# Patient Record
Sex: MALE | Race: WHITE | Employment: FULL TIME | URBAN - METROPOLITAN AREA
[De-identification: names, ages, dates, MRNs, and addresses within clinical notes are randomized per-mention and may not be internally consistent; named-entity substitution may affect disease eponyms.]

---

## 2018-08-20 ENCOUNTER — TELEPHONE (OUTPATIENT)
Dept: OTHER | Facility: CLINIC | Age: 38
End: 2018-08-20

## 2018-08-20 NOTE — TELEPHONE ENCOUNTER
8/20/2018    Call Regarding Onboarding [employer] [facility]    Attempt 1    Message on voicemail    Comments:       Outreach   Lydia Redding

## 2018-08-24 NOTE — TELEPHONE ENCOUNTER
8/24/2018    Call Regarding Onboarding [employer] [facility]    Attempt 2    Message on voicemail    Comments:       Outreach   Lydia Redding

## 2018-09-21 NOTE — TELEPHONE ENCOUNTER
9/21/2018    Call Regarding Onboarding [employer] [facility]    Attempt 3    Message on voicemail     Comments: 0 DEP      Outreach   CC

## 2019-06-12 ENCOUNTER — TRANSFERRED RECORDS (OUTPATIENT)
Dept: HEALTH INFORMATION MANAGEMENT | Facility: CLINIC | Age: 39
End: 2019-06-12

## 2019-06-14 ENCOUNTER — HOSPITAL ENCOUNTER (EMERGENCY)
Facility: CLINIC | Age: 39
Discharge: HOME | End: 2019-06-14
Attending: EMERGENCY MEDICINE | Admitting: EMERGENCY MEDICINE
Payer: COMMERCIAL

## 2019-06-14 VITALS
BODY MASS INDEX: 28.01 KG/M2 | SYSTOLIC BLOOD PRESSURE: 130 MMHG | HEART RATE: 85 BPM | HEIGHT: 76 IN | RESPIRATION RATE: 16 BRPM | WEIGHT: 230 LBS | OXYGEN SATURATION: 98 % | TEMPERATURE: 98.6 F | DIASTOLIC BLOOD PRESSURE: 79 MMHG

## 2019-06-14 DIAGNOSIS — K61.0 PERIANAL ABSCESS: ICD-10-CM

## 2019-06-14 LAB
BASOPHILS # BLD AUTO: 0.1 10E9/L (ref 0–0.2)
BASOPHILS NFR BLD AUTO: 0.8 %
DIFFERENTIAL METHOD BLD: ABNORMAL
EOSINOPHIL # BLD AUTO: 0.2 10E9/L (ref 0–0.7)
EOSINOPHIL NFR BLD AUTO: 1.8 %
ERYTHROCYTE [DISTWIDTH] IN BLOOD BY AUTOMATED COUNT: 12.4 % (ref 10–15)
HCT VFR BLD AUTO: 42.8 %
HGB BLD-MCNC: 14 G/DL (ref 11.7–17.7)
IMM GRANULOCYTES # BLD: 0.1 10E9/L (ref 0–0.4)
IMM GRANULOCYTES NFR BLD: 0.5 %
LYMPHOCYTES # BLD AUTO: 4.4 10E9/L (ref 0.8–5.3)
LYMPHOCYTES NFR BLD AUTO: 33.2 %
MCH RBC QN AUTO: 29.6 PG (ref 26.5–33)
MCHC RBC AUTO-ENTMCNC: 32.7 G/DL (ref 31.5–36.5)
MCV RBC AUTO: 91 FL (ref 78–100)
MONOCYTES # BLD AUTO: 0.7 10E9/L (ref 0–1.3)
MONOCYTES NFR BLD AUTO: 5.3 %
NEUTROPHILS # BLD AUTO: 7.8 10E9/L (ref 1.6–8.3)
NEUTROPHILS NFR BLD AUTO: 58.4 %
NRBC # BLD AUTO: 0 10*3/UL
NRBC BLD AUTO-RTO: 0 /100
PLATELET # BLD AUTO: 392 10E9/L (ref 150–450)
RBC # BLD AUTO: 4.73 10E12/L
WBC # BLD AUTO: 13.3 10E9/L (ref 4–11)

## 2019-06-14 PROCEDURE — 85025 COMPLETE CBC W/AUTO DIFF WBC: CPT | Performed by: INTERNAL MEDICINE

## 2019-06-14 PROCEDURE — 25000132 ZZH RX MED GY IP 250 OP 250 PS 637: Performed by: EMERGENCY MEDICINE

## 2019-06-14 PROCEDURE — 99283 EMERGENCY DEPT VISIT LOW MDM: CPT | Performed by: EMERGENCY MEDICINE

## 2019-06-14 PROCEDURE — 99283 EMERGENCY DEPT VISIT LOW MDM: CPT | Mod: Z6 | Performed by: EMERGENCY MEDICINE

## 2019-06-14 RX ORDER — METRONIDAZOLE 500 MG/1
500 TABLET ORAL 2 TIMES DAILY
Qty: 14 TABLET | Refills: 1 | Status: SHIPPED | OUTPATIENT
Start: 2019-06-14 | End: 2019-06-21

## 2019-06-14 RX ORDER — CIPROFLOXACIN 500 MG/1
500 TABLET, FILM COATED ORAL 2 TIMES DAILY
Qty: 14 TABLET | Refills: 0 | Status: SHIPPED | OUTPATIENT
Start: 2019-06-14 | End: 2021-04-22

## 2019-06-14 RX ORDER — CHOLECALCIFEROL (VITAMIN D3) 50 MCG
1 TABLET ORAL DAILY
COMMUNITY

## 2019-06-14 RX ORDER — CETIRIZINE HYDROCHLORIDE 10 MG/1
10 TABLET ORAL DAILY
COMMUNITY

## 2019-06-14 RX ORDER — CIPROFLOXACIN 500 MG/1
500 TABLET, FILM COATED ORAL ONCE
Status: COMPLETED | OUTPATIENT
Start: 2019-06-14 | End: 2019-06-14

## 2019-06-14 RX ORDER — METRONIDAZOLE 500 MG/1
500 TABLET ORAL EVERY 8 HOURS SCHEDULED
Status: DISCONTINUED | OUTPATIENT
Start: 2019-06-14 | End: 2019-06-15 | Stop reason: HOSPADM

## 2019-06-14 RX ADMIN — CIPROFLOXACIN HYDROCHLORIDE 500 MG: 500 TABLET, FILM COATED ORAL at 22:15

## 2019-06-14 RX ADMIN — METRONIDAZOLE 500 MG: 500 TABLET ORAL at 22:15

## 2019-06-14 ASSESSMENT — ENCOUNTER SYMPTOMS
ABDOMINAL PAIN: 0
RECTAL PAIN: 1
CHILLS: 0
DIARRHEA: 1
FEVER: 0

## 2019-06-14 ASSESSMENT — MIFFLIN-ST. JEOR: SCORE: 2064.77

## 2019-06-14 NOTE — ED AVS SNAPSHOT
[facility], [place], Emergency Department  [address] [place] [id number]  Phone:  [phone]                                    Gabo Borden   MRN: [id number]    Department:  [facility], Emergency Department   Date of Visit:  6/14/2019           After Visit Summary Signature Page    I have received my discharge instructions, and my questions have been answered. I have discussed any challenges I see with this plan with the nurse or doctor.    ..........................................................................................................................................  Patient/Patient Representative Signature      ..........................................................................................................................................  Patient Representative Print Name and Relationship to Patient    ..................................................               ................................................  Date                                   Time    ..........................................................................................................................................  Reviewed by Signature/Title    ...................................................              ..............................................  Date                                               Time          22EPIC Rev 08/18

## 2019-06-14 NOTE — ED TRIAGE NOTES
Went to [facility] for diarrhea 12 days duration  Was told he now has a perianal abscess which spontaneously ruptured today    Pain negligible

## 2019-06-14 NOTE — ED NOTES
Bed: IN04  Expected date: 6/14/19  Expected time:   Means of arrival:   Comments:  Gabo Borden  [birth year]  Sent from [facility],  39yo with gluteal cleft abscess  Type 1 diabetic, recent return from [place]

## 2019-06-15 NOTE — DISCHARGE INSTRUCTIONS
Take antibiotics as prescribed.     Do sitz baths 2 times per day.     Please make an appointment to follow up with [facility] (phone: [phone]) in 2-3 days even if entirely better.    Return to the ER if worsening of pain, fever, or chills, or any other problems/concerns.

## 2019-06-15 NOTE — ED PROVIDER NOTES
[place] EMERGENCY DEPARTMENT ([facility])  June 14, 2019    History     Chief Complaint   Patient presents with     Wound Check     The history is provided by the patient and medical records.     Gabo Borden is a 38 year old adult without significant PMHx who presents to the Emergency Department today for evaluation of perianal abscess. Patient states he returned from [place] on Saturday, 06/08. For the past 12 days since 06/02, patient reports multiple episodes (between 5x to 8x) of diarrhea each day. Yesterday he noted improvement with only 1 episode of diarrhea. Today, he endorses having a solid bowel movement accompanied by rectal pain. Patient denies abdominal pain, fevers or chills. Additionally, patient denies taking antibiotics or inserting anything into his rectum.  Patient is that today he went to [facility] to be evaluated by his PCP and the PCP became concerned about some drainage that he was having around his anus was admitted to the ER for evaluation.  Patient says that currently he is feeling better and his pain is significantly improved.  Denies any systemic symptoms including fevers or chills.    I have reviewed the Medications, Allergies, Past Medical and Surgical History, and Social History in the Epic system.    No past medical history on file.    No past surgical history on file.    No family history on file.    Social History     Tobacco Use     Smoking status: Not on file   Substance Use Topics     Alcohol use: Not on file       No current facility-administered medications for this encounter.      Current Outpatient Medications   Medication     cetirizine (ZYRTEC) 10 MG tablet     insulin NPH (HUMULIN N/NOVOLIN N VIAL) 100 UNIT/ML vial     insulin regular (HUMULIN R/NOVOLIN R VIAL) 100 UNIT/ML vial     vitamin D3 (CHOLECALCIFEROL) 2000 units (50 mcg) tablet      No Known Allergies      Review of Systems   Constitutional: Negative for chills and fever.   Gastrointestinal:  "Positive for diarrhea and rectal pain (Perianal pain). Negative for abdominal pain.   All other systems reviewed and are negative.      Physical Exam   BP: 139/86  Pulse: 99  Temp: 98.6  F (37  C)  Resp: 16  Height: 193 cm (6' 4\")  Weight: 104.3 kg (230 lb)  SpO2: 98 %      Physical Exam   Constitutional: Gabo Borden is oriented to person, place, and time. Gabo Borden appears well-developed and well-nourished.   HENT:   Head: Normocephalic and atraumatic.   Neck: Normal range of motion.   Cardiovascular: Normal rate, regular rhythm and normal heart sounds.   Pulmonary/Chest: Effort normal and breath sounds normal. No respiratory distress.   Abdominal: Soft. Gabo Borden exhibits no distension. There is no tenderness. There is no rebound.   Genitourinary:         Musculoskeletal: Gabo Borden exhibits no tenderness.   Neurological: Gabo Borden is alert and oriented to person, place, and time.   Skin: Skin is warm and dry.   Psychiatric: Gabo Borden has a normal mood and affect. Gabo Montenegros behavior is normal. Thought content normal.       ED Course   9:23 PM  The patient was seen and examined by Bebe Kennedy MD, in [facility]        Procedures             Critical Care time:  none     Results for orders placed or performed during the hospital encounter of 06/14/19   CBC with platelets differential   Result Value Ref Range    WBC 13.3 (H) 4.0 - 11.0 10e9/L    RBC Count 4.73 10e12/L    Hemoglobin 14.0 11.7 - 17.7 g/dL    Hematocrit 42.8 %    MCV 91 78 - 100 fl    MCH 29.6 26.5 - 33.0 pg    MCHC 32.7 31.5 - 36.5 g/dL    RDW 12.4 10.0 - 15.0 %    Platelet Count 392 150 - 450 10e9/L    Diff Method Automated Method     % Neutrophils 58.4 %    % Lymphocytes 33.2 %    % Monocytes 5.3 %    % Eosinophils 1.8 %    % Basophils 0.8 %    % Immature Granulocytes 0.5 %    Nucleated RBCs 0 /100    Absolute Neutrophil 7.8 1.6 - 8.3 10e9/L    Absolute Lymphocytes 4.4 0.8 - 5.3 10e9/L    Absolute Monocytes 0.7 0.0 - " 1.3 10e9/L    Absolute Eosinophils 0.2 0.0 - 0.7 10e9/L    Absolute Basophils 0.1 0.0 - 0.2 10e9/L    Abs Immature Granulocytes 0.1 0 - 0.4 10e9/L    Absolute Nucleated RBC 0.0      Medications   ciprofloxacin (CIPRO) tablet 500 mg (500 mg Oral Given 6/14/19 3964)            No results found for this or any previous visit (from the past 24 hour(s)).      Labs Ordered and Resulted from Time of ED Arrival Up to the Time of Departure from the ED - No data to display         Assessments & Plan (with Medical Decision Making)   Patient is a 38-year-old male who presented to the ER due to draining around his anus from the area that he previously thought was a hemorrhoid.  It appears the patient likely had a small perianal abscess that has now opened up and is draining.  There does not appear to be any surrounding fluctuance or pain that needs to be further I&D at this time.  Discussed the case with the colorectal fellow who agrees the patient following up in clinic in 2 days.  Due to him being a diabetic we will start him on a course of antibiotics.  Patient does have a mild leukocytosis.  Patient has no systemic signs and no abdominal pain.  Patient agrees to return to the ER if symptoms worsen.    I have reviewed the nursing notes.    I have reviewed the findings, diagnosis, plan and need for follow up with the patient.       Medication List      There are no discharge medications for this visit.         Final diagnoses:   Perianal abscess     Darien SHARMA, am serving as a trained medical scribe to document services personally performed by Bebe Kennedy MD, based on the provider's statements to me.      Bebe SHARMA MD, was physically present and have reviewed and verified the accuracy of this note documented by Darien Irizarry.       6/14/2019   [facility], [place], EMERGENCY DEPARTMENT     Bebe Kennedy MD  06/15/19 0138

## 2019-06-18 ENCOUNTER — TELEPHONE (OUTPATIENT)
Dept: SURGERY | Facility: CLINIC | Age: 39
End: 2019-06-18

## 2019-06-18 NOTE — TELEPHONE ENCOUNTER
LM for patient to call back to make f/u appt with Cass Sin NP after hospital discharge.    Mena Kitchen LPN

## 2019-06-19 NOTE — TELEPHONE ENCOUNTER
Patient return call, offered patient appointment for 6/20/19 @ 11am. Patient unable to make appt. He said he will f/u with his primary care provider. He also complaint of some pain behind his knee, he was given cipro to take after discharge. Spoke to Cass Sin NP, patient to discontinue taking cipro and follow up with primary care provider. Patient agrees with plan. Patient will call us to make appt if his primary feels he needs to be seen by us again.    Mena Kitchen LPN

## 2019-06-19 NOTE — TELEPHONE ENCOUNTER
Left message for patient again to call back to set up f/u appt after his ED visit. Left direct number for pt to call back.    Mena Kitchen LPN

## 2020-08-13 ENCOUNTER — HOSPITAL ENCOUNTER (EMERGENCY)
Facility: CLINIC | Age: 40
Discharge: HOME | End: 2020-08-13
Attending: PHYSICIAN ASSISTANT | Admitting: PHYSICIAN ASSISTANT
Payer: COMMERCIAL

## 2020-08-13 VITALS
BODY MASS INDEX: 30.44 KG/M2 | RESPIRATION RATE: 18 BRPM | TEMPERATURE: 98.7 F | SYSTOLIC BLOOD PRESSURE: 146 MMHG | HEIGHT: 76 IN | OXYGEN SATURATION: 97 % | WEIGHT: 250 LBS | DIASTOLIC BLOOD PRESSURE: 82 MMHG

## 2020-08-13 DIAGNOSIS — K61.0 PERIANAL ABSCESS: ICD-10-CM

## 2020-08-13 PROCEDURE — 46040 I&D ISCHIORCT&/PERIRCT ABSC: CPT

## 2020-08-13 PROCEDURE — 99283 EMERGENCY DEPT VISIT LOW MDM: CPT | Mod: 25

## 2020-08-13 RX ORDER — DOXYCYCLINE 100 MG/1
100 CAPSULE ORAL 2 TIMES DAILY
Qty: 20 CAPSULE | Refills: 0 | Status: SHIPPED | OUTPATIENT
Start: 2020-08-13 | End: 2020-08-23

## 2020-08-13 RX ORDER — BISACODYL 5 MG/1
5 TABLET, DELAYED RELEASE ORAL DAILY PRN
Qty: 10 TABLET | Refills: 0 | Status: SHIPPED | OUTPATIENT
Start: 2020-08-13 | End: 2020-08-18

## 2020-08-13 ASSESSMENT — MIFFLIN-ST. JEOR: SCORE: 2145.49

## 2020-08-13 ASSESSMENT — ENCOUNTER SYMPTOMS
RECTAL PAIN: 1
CONSTIPATION: 1

## 2020-08-13 NOTE — ED PROVIDER NOTES
"  History     Chief Complaint:  Rectal pain    HPI   Gabo Borden is a 40 year old adult male who presents with rectal pain. The patient states that his pain has progressively worsened since it began on Sunday. He states that sitting down on the toilet hurts and due to inflammation he has not been able to have a bowel movement. He has been taking Miralax and was able to have a few small bowel movements. He is concerned about an abscess and infection, noting history of perianal abscess. He went to his PCP where he was given 2.5% hydrocortisone for external hemorrhoid. He denies any abdominal pain, fever or chills.     The patient has type 1 diabetes. He reports cutting back on his insulin due to decreased appetite. He did note high blood sugar before presentation.     Allergies:  Ciprofloxacin     Medications:    Zyrtec   Cipro   Humulin    Past Medical History:    Type I Diabetes   Asthma     Past Surgical History:    The patient does not have any pertinent past surgical history.    Family History:    Thyroid disease - mother   Type I Diabetes - father    Social History:  The patient was accompanied to the ED by his .  Smoking Status: Current smoker  Smokeless Tobacco: Never Used  Alcohol Use: Positive  Marital Status:  Single     Review of Systems   Gastrointestinal: Positive for constipation and rectal pain.        External hemorrhoids and associated pain   All other systems reviewed and are negative.      Physical Exam     Patient Vitals for the past 24 hrs:   BP Temp Temp src Heart Rate Resp SpO2 Height Weight   08/13/20 1619 (!) 146/82 98.7  F (37.1  C) Oral 111 18 97 % -- --   08/13/20 1617 (!) 146/82 98.7  F (37.1  C) Oral 112 18 97 % 1.93 m (6' 4\") 113.4 kg (250 lb)       Physical Exam   General: Alert and interactive. Appears well.   Head: Atraumatic, without obvious lesion, abrasion, hematoma.   Eyes: The pupils are equal and round. No scleral icterus.   ENT: No obvious abnormalities to the ears or " nose. Mucous membranes moist.   Neck:Trachea is in the midline. No obvious swelling to the neck. Full range of motion.   CV: Tachycardia. Extremities well perfused. Capillary refill brisk in fingers.   Resp: Non-labored, no retractions or accessory muscle use.     GI: Abdomen is not distended.   MS: Moving all extremities well.   Rectal: Patient has a 2 cm area of fluctuance to the superior aspect of the anus, at 12 o'clock position. This is just inferior to a previous incision. There is no active drainage. There is erythema and slight warmth. There is normal rectal tone, and no obvious tracking of the abscess into the anus. There is no crepitance. There are no skin tags or hemorrhoids.   Neuro: Alert and oriented x 3. Non-focal examination.    Psych: Awake. Alert.  Normal affect. Appropriate interactions.    Emergency Department Course     Procedures    Incision and Drainage     LOCATIONS:  Perianal     ANESTHESIA:  Local field block using Lidocaine 1% with epinephrine, total of 3 mLs     PREPARATION:  Cleansed with Normal Saline, Betadine     PROCEDURE:  Area was incised with # 11 Blade (Sharp Point) with a Single Straight incision. Wound treatment included Deloculation and Copious Purulent Drainage.  Packing consisted of No Packing.  Appropriate dressing was applied to cover the area.    PATIENT STATUS:        Patient tolerated the procedure well. There were no complications.        Emergency Department Course:     Nursing notes and vitals reviewed.    1625 I performed an exam of the patient as documented above.     1630 I performed the incision & drainage procedure as documented above.    1654 Findings and plan explained to the Patient. Patient discharged home with instructions regarding supportive care, medications, and reasons to return. The importance of close follow-up was reviewed. The patient was prescribed medications listed below.     Impression & Plan    Medical Decision Making:  Gabo Borden is a 40  year old adult who presents with perianal pain with history of perianal abscess. On exam, patient has abscess at 12 o'clock position, consistent with perianal abscess. This does not seem to track deeply into the anus/rectum. No systemic symptoms. I&D performed and successfully expressed purulent drainage, as noted in above procedure note. No signs of serious infection, like necrotizing fascitis or rapid cellulitis. Will plan for treatment with Doxycycline, hot compresses, and follow up with colon and rectal surgery for definitive management. Suggested ibuprofen/tylenol for pain and stool softening with Dulcolax. Return precautions discussed, which include fevers, chills, spreading redness, increase in pain.     Diagnosis:    ICD-10-CM    1. Perianal abscess  K61.0      Disposition:   Patient is discharged home.     Discharge Medications:     START taking      Dose / Directions   bisacodyl 5 MG EC tablet  Commonly known as:  DULCOLAX      Dose:  5 mg  Take 1 tablet (5 mg) by mouth daily as needed for constipation  Quantity:  10 tablet  Refills:  0     doxycycline hyclate 100 MG capsule  Commonly known as:  VIBRAMYCIN      Dose:  100 mg  Take 1 capsule (100 mg) by mouth 2 times daily for 10 days  Quantity:  20 capsule  Refills:  0       Scribe Disclosure:  EDITH, Kim Fields, am serving as a scribe at 4:26 PM on 8/13/2020 to document services personally performed by Brittaney Ugalde PA-C based on my observations and the provider's statements to me.   EMERGENCY DEPARTMENT       Brittaney Ugalde PA-C  08/13/20 4894

## 2020-08-13 NOTE — ED AVS SNAPSHOT
Emergency Department  [address] [facility] [address]  Phone:  [phone]  Fax:  [phone]                                    Gabo Borden   MRN: [id number]    Department:   Emergency Department   Date of Visit:  8/13/2020           After Visit Summary Signature Page    I have received my discharge instructions, and my questions have been answered. I have discussed any challenges I see with this plan with the nurse or doctor.    ..........................................................................................................................................  Patient/Patient Representative Signature      ..........................................................................................................................................  Patient Representative Print Name and Relationship to Patient    ..................................................               ................................................  Date                                   Time    ..........................................................................................................................................  Reviewed by Signature/Title    ...................................................              ..............................................  Date                                               Time          22EPIC Rev 08/18

## 2021-02-15 ENCOUNTER — TRANSFERRED RECORDS (OUTPATIENT)
Dept: HEALTH INFORMATION MANAGEMENT | Facility: CLINIC | Age: 41
End: 2021-02-15

## 2021-02-15 ENCOUNTER — MEDICAL CORRESPONDENCE (OUTPATIENT)
Dept: HEALTH INFORMATION MANAGEMENT | Facility: CLINIC | Age: 41
End: 2021-02-15

## 2021-03-03 NOTE — TELEPHONE ENCOUNTER
RECORDS RECEIVED FROM: Internal /received    DATE RECEIVED: 3.24.21    NOTES (FOR ALL VISITS) STATUS DETAILS   OFFICE NOTES from referring provider Internal  Marian Lang at [facility]    OFFICE NOTES from other specialist received  [place] [facility] 2.16.21    ED NOTES na    OPERATIVE REPORT  (thyroid, pituitary, adrenal, parathyroid) na    MEDICATION LIST Internal     IMAGING      DEXASCAN na    MRI (BRAIN) na    XR (Chest) na    CT (HEAD/NECK/CHEST/ABDOMEN) na    NUCLEAR  na    ULTRASOUND (HEAD/NECK) na    LABS     DIABETES: HBGA1C, CREATININE, FASTING LIPIDS, MICROALBUMIN URINE, POTASSIUM, TSH, T4    THYROID: TSH, T4, CBC, THYRODLONULIN, TOTAL T3, FREE T4, CALCITONIN, CEA Internal  6.14.29

## 2021-03-07 ENCOUNTER — HEALTH MAINTENANCE LETTER (OUTPATIENT)
Age: 41
End: 2021-03-07

## 2021-03-22 NOTE — PATIENT INSTRUCTIONS
"  It was great meeting you today!    As we discussed:   - switch insulins to Tresiba 30 units s/c daily at bedtime, and Novolog 5 units per 15 grams of carbs before meals, plus correction of 2 units for every range of 50 above 150 BG.   -  and start using the DEXCOM G6 CGMS.   - Use the ronald \"Inspirispal\" to help with carb counting and also calorie tracking  - schedule visit with your NP for diabetes education  - get the following tests through your PCP: HbA1c, TSH and urine albumin  - start simvastatin 20 mg daily through your PCP.     Follow up with me in 3 months.     Katie Booker MD  Endocrinology, Diabetes and Metabolism  [facility]      --------------------------------------------------  We appreciate your assistance in coordinating your healthcare.     Please upload your insulin pump, blood sugar meter and/or continuous glucose monitor at home 1-2 days before your next diabetes-related appointment.   This will allow your provider to review your  data before your scheduled virtual visit.    To ask a question to your Endocrine care team, please send them a [employer] message, or reach them by phone at [phone]     To expedite your medication refill(s), please contact your pharmacy and have them   fax a refill request to: [phone].  *Please allow 3 business days for routine medication refills.  *Please allow 5 business days for controlled substance medication refills.    For after-hours urgent Endocrine issues, that do not require 911, please dial [phone], and ask to speak with the Endocrinologist On-Call          "

## 2021-03-22 NOTE — PROGRESS NOTES
Outcome for 03/22/21 12:21 PM :Glucose data sent in Karma Message     Gabo Borden  is being evaluated via a billable video visit.      How would you like to obtain your AVS? [employer]  For the video visit, send the invitation by: Send to e-mail at: [email or web address]  Will anyone else be joining your video visit? No      Video Start Time: 10:00    Assessment & Plan     2- Type 1 diabetes mellitus without complication (H)    He has done well over the years, on NPH And R. Now BG is more erratic. Might be related to weight gain and lifestyle changes.     Plan:   - start using DEXCOM G6.   - switch insulin to Tresiba 30 units at bedtime, and Novolog 5 units per 15 grams plus 2/50 >150  - Diabetes ed through his PCP who is a CDE.   - consider switching to Tandem pump in the near future  - carb counting skills.     - update HbA1c.     Diabetes Care:   Eyes: no retinopathy, last eye exam 1.5 years ago  Kidneys: no nephropathy, last urine alb Oct 2018. - needs update  Nerves: no neuropathy  Smoking: no  Blood Pressure: HTN, had cough to ACE inh, switched to ARB (losartan)  Lipids: no HL - advised starting statin  Macrovascular: no issues    Prescriptions:   - Continuous Blood Gluc  (DEXCOM G6 ) JESSICA  Dispense: 1 each; Refill: 0  - Continuous Blood Gluc Sensor (DEXCOM G6 SENSOR) MISC  Dispense: 3 each; Refill: 11  - Continuous Blood Gluc Transmit (DEXCOM G6 TRANSMITTER) MISC  Dispense: 1 each; Refill: 3  - insulin degludec (TRESIBA FLEXTOUCH) 100 UNIT/ML pen  Dispense: 30 mL; Refill: 3  - NOVOLOG FLEXPEN 100 UNIT/ML soln  Dispense: 30 mL; Refill: 6  - insulin pen needle (31G X 5 MM) 31G X 5 MM miscellaneous  Dispense: 300 each; Refill: 6    2- Subclinical hypothyroidism:   TSH 9.5 in 2018  Repeat TSH  Likely will need therapy if still in that range or higher.     3- Weight gain:   Discussed dietary and lifestyle interventions includes diet tracking and intermittent fasting.   Increase exercise.     Review  "of the result(s) of each unique test - he reported via video his latest tests in 2019 as indicated in HPI.      75 minutes spent on the date of the encounter doing chart review, history and exam, documentation and further activities as noted above       BMI:   Estimated body mass index is 30.43 kg/m  as calculated from the following:    Height as of 8/13/20: 1.93 m (6' 4\").    Weight as of 8/13/20: 113.4 kg (250 lb).   Weight management plan: Discussed healthy diet and exercise guidelines      Return in about 3 months (around 6/24/2021).    Katie Booker MD  [facility] ENDOCRINOLOGY CLINIC [place]    -----------------------------------------------------------------------------    Subjective   Gabo is a 40 year old who presents for the following health issues: type 1 diabetes.     HPI     The patient was diagnosed with type 1 diabetes at age 2. Did well in childhood, was more unstable control in teen years. Then has been in good control most of his adult life with diabetes. No DKA. One severe hypoglycemia. He has hypoglycemia awareness. Has not been on pumps and CGMs. Has been on taking NPH and R insulin. Was switched to Lantus and Humalog 5 years ago but his control was not any better and the cost was high, so he switched back. Things were good, until the past year his control has worsened. He started working from home. He now eats breakfast every day (in the past he didn't).   He has gained 35 lbs in the past year from less activity and more calorie intake.   Was able to recently lose 6 lbs with diet and lifestyle changes.     Last HbA1c was Oct 2019: 8.1  His baseline when he was in good control was 6.7 - 7.3.  In 2018: TSH 9.51, free T4: 0.9    Diabetes Care:   Eyes: no retinopathy, last eye exam 1.5 years ago  Kidneys: no nephropathy, last urine alb Oct 2018.   Nerves: no neuropathy  Smoking: no  Blood Pressure: HTN, had cough to ACE inh, switched to ARB (losartan)  Lipids: no HL  Macrovascular: no " issues    Current treatment strategy:   Pre-pandemic insulin: Regular 30 units in AM, 30 units at noon, 35 units at dinner. NPH 15 units in the morning and 25 units at bedtime.      Now: Regular 35 units in the morning, 30 units at noon, 25 units at dinner. NPH 15 units in the morning and 15 units at bedtime.     Blood Glucose Monitoring:   Reviewed the BG he sent. Variable in the 50 to 200s range. No clear pattern.     Diet: 2-3 meals/day    Exercise: walking      Review of Systems   Constitutional, HEENT, cardiovascular, pulmonary, gi and gu systems are negative, except as otherwise noted.      Objective           Vitals:  No vitals were obtained today due to virtual visit.    Physical Exam   GENERAL: Healthy, alert and no distress  EYES: Eyes grossly normal to inspection.  No discharge or erythema, or obvious scleral/conjunctival abnormalities.  RESP: No audible wheeze, cough, or visible cyanosis.  No visible retractions or increased work of breathing.    SKIN: Visible skin clear. No significant rash, abnormal pigmentation or lesions.  NEURO: Cranial nerves grossly intact.  Mentation and speech appropriate for age.  PSYCH: Mentation appears normal, affect normal/bright, judgement and insight intact, normal speech and appearance well-groomed.        Video-Visit Details    Type of service:  Video Visit    Video End Time:11:00    Originating Location (pt. Location): Home    Distant Location (provider location):  [facility] ENDOCRINOLOGY [facility]IN[facility] [place]     Platform used for Video Visit: SHADO

## 2021-03-24 ENCOUNTER — VIRTUAL VISIT (OUTPATIENT)
Dept: ENDOCRINOLOGY | Facility: CLINIC | Age: 41
End: 2021-03-24
Payer: COMMERCIAL

## 2021-03-24 ENCOUNTER — PRE VISIT (OUTPATIENT)
Dept: ENDOCRINOLOGY | Facility: CLINIC | Age: 41
End: 2021-03-24

## 2021-03-24 DIAGNOSIS — E03.8 SUBCLINICAL HYPOTHYROIDISM: ICD-10-CM

## 2021-03-24 DIAGNOSIS — R63.5 WEIGHT GAIN: ICD-10-CM

## 2021-03-24 DIAGNOSIS — E10.9 TYPE 1 DIABETES MELLITUS WITHOUT COMPLICATION (H): Primary | ICD-10-CM

## 2021-03-24 PROCEDURE — 99205 OFFICE O/P NEW HI 60 MIN: CPT | Mod: 95 | Performed by: INTERNAL MEDICINE

## 2021-03-24 RX ORDER — INSULIN ASPART 100 [IU]/ML
INJECTION, SOLUTION INTRAVENOUS; SUBCUTANEOUS
Qty: 30 ML | Refills: 6 | Status: SHIPPED | OUTPATIENT
Start: 2021-03-24 | End: 2021-11-23

## 2021-03-24 RX ORDER — PROCHLORPERAZINE 25 MG/1
SUPPOSITORY RECTAL
Qty: 3 EACH | Refills: 11 | Status: SHIPPED | OUTPATIENT
Start: 2021-03-24 | End: 2021-03-28

## 2021-03-24 RX ORDER — PROCHLORPERAZINE 25 MG/1
SUPPOSITORY RECTAL
Qty: 1 EACH | Refills: 3 | Status: SHIPPED | OUTPATIENT
Start: 2021-03-24 | End: 2021-03-28

## 2021-03-24 RX ORDER — PROCHLORPERAZINE 25 MG/1
SUPPOSITORY RECTAL
Qty: 1 EACH | Refills: 0 | Status: SHIPPED | OUTPATIENT
Start: 2021-03-24 | End: 2021-03-28

## 2021-03-24 NOTE — LETTER
3/24/2021       RE: Gabo Borden  [address] [facility] [address]     Dear Colleague,    Thank you for referring your patient, Gabo Borden, to the [facility] ENDOCRINOLOGY [facility] [place] at [facility]. Please see a copy of my visit note below.    Outcome for 03/22/21 12:21 PM :Glucose data sent in Solve Media Message     Gabo Borden  is being evaluated via a billable video visit.      How would you like to obtain your AVS? SnapShop  For the video visit, send the invitation by: Send to e-mail at: [email or web address]  Will anyone else be joining your video visit? No      Video Start Time: 10:00    Assessment & Plan     2- Type 1 diabetes mellitus without complication (H)    He has done well over the years, on NPH And R. Now BG is more erratic. Might be related to weight gain and lifestyle changes.     Plan:   - start using DEXCOM G6.   - switch insulin to Tresiba 30 units at bedtime, and Novolog 5 units per 15 grams plus 2/50 >150  - Diabetes ed through his PCP who is a CDE.   - consider switching to Tandem pump in the near future  - carb counting skills.     - update HbA1c.     Diabetes Care:   Eyes: no retinopathy, last eye exam 1.5 years ago  Kidneys: no nephropathy, last urine alb Oct 2018. - needs update  Nerves: no neuropathy  Smoking: no  Blood Pressure: HTN, had cough to ACE inh, switched to ARB (losartan)  Lipids: no HL - advised starting statin  Macrovascular: no issues    Prescriptions:   - Continuous Blood Gluc  (DEXCOM G6 ) JESSICA  Dispense: 1 each; Refill: 0  - Continuous Blood Gluc Sensor (DEXCOM G6 SENSOR) MISC  Dispense: 3 each; Refill: 11  - Continuous Blood Gluc Transmit (DEXCOM G6 TRANSMITTER) MISC  Dispense: 1 each; Refill: 3  - insulin degludec (TRESIBA FLEXTOUCH) 100 UNIT/ML pen  Dispense: 30 mL; Refill: 3  - NOVOLOG FLEXPEN 100 UNIT/ML soln  Dispense: 30 mL; Refill: 6  - insulin pen needle (31G X 5 MM) 31G X 5  "MM miscellaneous  Dispense: 300 each; Refill: 6    2- Subclinical hypothyroidism:   TSH 9.5 in 2018  Repeat TSH  Likely will need therapy if still in that range or higher.     3- Weight gain:   Discussed dietary and lifestyle interventions includes diet tracking and intermittent fasting.   Increase exercise.     Review of the result(s) of each unique test - he reported via video his latest tests in 2019 as indicated in HPI.      75 minutes spent on the date of the encounter doing chart review, history and exam, documentation and further activities as noted above       BMI:   Estimated body mass index is 30.43 kg/m  as calculated from the following:    Height as of 8/13/20: 1.93 m (6' 4\").    Weight as of 8/13/20: 113.4 kg (250 lb).   Weight management plan: Discussed healthy diet and exercise guidelines      Return in about 3 months (around 6/24/2021).    Katie Booker MD  [facility] ENDOCRINOLOGY CLINIC [place]    -----------------------------------------------------------------------------    Subjective   Gabo is a 40 year old who presents for the following health issues: type 1 diabetes.     HPI     The patient was diagnosed with type 1 diabetes at age 2. Did well in childhood, was more unstable control in teen years. Then has been in good control most of his adult life with diabetes. No DKA. One severe hypoglycemia. He has hypoglycemia awareness. Has not been on pumps and CGMs. Has been on taking NPH and R insulin. Was switched to Lantus and Humalog 5 years ago but his control was not any better and the cost was high, so he switched back. Things were good, until the past year his control has worsened. He started working from home. He now eats breakfast every day (in the past he didn't).   He has gained 35 lbs in the past year from less activity and more calorie intake.   Was able to recently lose 6 lbs with diet and lifestyle changes.     Last HbA1c was Oct 2019: 8.1  His baseline when he was in " good control was 6.7 - 7.3.  In 2018: TSH 9.51, free T4: 0.9    Diabetes Care:   Eyes: no retinopathy, last eye exam 1.5 years ago  Kidneys: no nephropathy, last urine alb Oct 2018.   Nerves: no neuropathy  Smoking: no  Blood Pressure: HTN, had cough to ACE inh, switched to ARB (losartan)  Lipids: no HL  Macrovascular: no issues    Current treatment strategy:   Pre-pandemic insulin: Regular 30 units in AM, 30 units at noon, 35 units at dinner. NPH 15 units in the morning and 25 units at bedtime.      Now: Regular 35 units in the morning, 30 units at noon, 25 units at dinner. NPH 15 units in the morning and 15 units at bedtime.     Blood Glucose Monitoring:   Reviewed the BG he sent. Variable in the 50 to 200s range. No clear pattern.     Diet: 2-3 meals/day    Exercise: walking      Review of Systems   Constitutional, HEENT, cardiovascular, pulmonary, gi and gu systems are negative, except as otherwise noted.      Objective           Vitals:  No vitals were obtained today due to virtual visit.    Physical Exam   GENERAL: Healthy, alert and no distress  EYES: Eyes grossly normal to inspection.  No discharge or erythema, or obvious scleral/conjunctival abnormalities.  RESP: No audible wheeze, cough, or visible cyanosis.  No visible retractions or increased work of breathing.    SKIN: Visible skin clear. No significant rash, abnormal pigmentation or lesions.  NEURO: Cranial nerves grossly intact.  Mentation and speech appropriate for age.  PSYCH: Mentation appears normal, affect normal/bright, judgement and insight intact, normal speech and appearance well-groomed.        Video-Visit Details    Type of service:  Video Visit    Video End Time:11:00    Originating Location (pt. Location): Home    Distant Location (provider location):  [facility] ENDOCRINOLOG[facility] CLIN[facility] [place]     Platform used for Video Visit: [employer]

## 2021-03-26 ENCOUNTER — IMMUNIZATION (OUTPATIENT)
Dept: PEDIATRICS | Facility: CLINIC | Age: 41
End: 2021-03-26
Payer: COMMERCIAL

## 2021-03-26 PROCEDURE — 0001A PR COVID VAC PFIZER DIL RECON 30 MCG/0.3 ML IM: CPT

## 2021-03-26 PROCEDURE — 91300 PR COVID VAC PFIZER DIL RECON 30 MCG/0.3 ML IM: CPT

## 2021-03-28 DIAGNOSIS — E10.9 TYPE 1 DIABETES MELLITUS WITHOUT COMPLICATION (H): ICD-10-CM

## 2021-03-28 RX ORDER — PROCHLORPERAZINE 25 MG/1
SUPPOSITORY RECTAL
Qty: 3 EACH | Refills: 11 | Status: SHIPPED | OUTPATIENT
Start: 2021-03-28 | End: 2021-04-26

## 2021-03-28 RX ORDER — PROCHLORPERAZINE 25 MG/1
SUPPOSITORY RECTAL
Qty: 1 EACH | Refills: 0 | Status: SHIPPED | OUTPATIENT
Start: 2021-03-28 | End: 2021-04-26

## 2021-03-28 RX ORDER — PROCHLORPERAZINE 25 MG/1
SUPPOSITORY RECTAL
Qty: 1 EACH | Refills: 3 | Status: SHIPPED | OUTPATIENT
Start: 2021-03-28 | End: 2021-04-26

## 2021-04-09 ENCOUNTER — MYC MEDICAL ADVICE (OUTPATIENT)
Dept: ENDOCRINOLOGY | Facility: CLINIC | Age: 41
End: 2021-04-09

## 2021-04-09 DIAGNOSIS — E10.9 TYPE 1 DIABETES MELLITUS WITHOUT COMPLICATION (H): Primary | ICD-10-CM

## 2021-04-16 ENCOUNTER — IMMUNIZATION (OUTPATIENT)
Dept: PEDIATRICS | Facility: CLINIC | Age: 41
End: 2021-04-16
Attending: INTERNAL MEDICINE
Payer: COMMERCIAL

## 2021-04-16 PROCEDURE — 91300 PR COVID VAC PFIZER DIL RECON 30 MCG/0.3 ML IM: CPT

## 2021-04-16 PROCEDURE — 0002A PR COVID VAC PFIZER DIL RECON 30 MCG/0.3 ML IM: CPT

## 2021-04-22 ENCOUNTER — TELEPHONE (OUTPATIENT)
Dept: EDUCATION SERVICES | Facility: CLINIC | Age: 41
End: 2021-04-22

## 2021-04-22 ENCOUNTER — VIRTUAL VISIT (OUTPATIENT)
Dept: EDUCATION SERVICES | Facility: CLINIC | Age: 41
End: 2021-04-22
Attending: INTERNAL MEDICINE
Payer: COMMERCIAL

## 2021-04-22 DIAGNOSIS — E10.9 TYPE 1 DIABETES MELLITUS WITHOUT COMPLICATION (H): ICD-10-CM

## 2021-04-22 PROCEDURE — G0108 DIAB MANAGE TRN  PER INDIV: HCPCS

## 2021-04-22 NOTE — TELEPHONE ENCOUNTER
Central Prior Authorization Team   Phone: [phone]      PA Initiation    Medication: DEXCOM -PA initiated  Insurance Company: [employer] - Phone [phone] Fax [phone]  Pharmacy Filling the Rx: [id number] - [place] - [address]  Filling Pharmacy Phone: [phone]  Filling Pharmacy Fax:    Start Date: 4/22/2021

## 2021-04-22 NOTE — PROGRESS NOTES
DIABETES SELF MANAGEMENT EDUCATION: Previous Diagnosis/Individual Diabetes Review    Gabo Borden presents today for education related to Type 1 diabetes.    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    Current Diabetes Management per Patient:  Diabetes medications: YES, Injectable Medications: Tresiba Insulin 30 units daily, Novolog 5 units per 15 grams, 2 units per 50 >150 Problems taking diabetes medications regularly? No   At risk for hypoglycemia? Yes , Symptoms: Sweating and Other and Frequency: twice a week, no overnight lows since changing insulin 3 weeks ago  BG meter: Reli On Blue meter  Patient glucose self monitoring as follows: 6 times daily.   BG results: 7-day average is 167 14-day average is 152  BG values are: in and out of goal  Patient's most recent A1C 8.1 10/2019    Nutrition:  Is carb counting his food and using myfitnesspal    Physical Activity:     no regular exercise program    Diabetes Complications:  Not discussed today.    Diabetes knowledge and skills assessment:     Patient is knowledgeable in diabetes management concepts related to: Healthy Eating, Being Active, Monitoring, Taking Medication and Problem Solving  Barriers to Learning Assessment: No Barriers identified    Based on learning assessment above, most appropriate setting for further diabetes education would be: Individual setting.    INTERVENTION:   Education provided today on:  We discussed his current insulin regimen which has helped his blood  sugar control immensely.  He is doing a good job problem-solving the dosing.  He is giving the Novolog before he eats.    He is interested in getting a sensor and he would benefit from this.  It would take his problem-solving to another level.  Will see if we can get a PA as it would benefit him to do data sharing due to frequent lows.  This can only be done with a DexCom.    Pt verbalized understanding of concepts discussed and recommendations provided today.       PLAN:  Will try to run a PA for DexCom and if not covered will order Manpreet 2.  See Patient Instructions for co-developed, patient-stated behavior change goals.  AVS printed and provided to patient today.    FOLLOW-UP:  Chart routed to referring provider.  Time Spent: 30 minutes  Encounter Type: Individual    Any diabetes medication dose changes were made via the CDE Protocol and Collaborative Practice Agreement with  Physicians.

## 2021-04-22 NOTE — TELEPHONE ENCOUNTER
"Phone call to [facility] to see if there was information regarding denial when his DexCom came back at \"not covered\" at pharmacy.  They state no.  Attempted to call [employer] to initiate PA.  Could not stay on hold.  Will ask PA team to follow up on this.      Needs data sharing provided only by Dexcom due to frequent low blood sugars. Ericka Lewis RN,CDE    "

## 2021-04-23 DIAGNOSIS — E10.9 TYPE 1 DIABETES MELLITUS WITHOUT COMPLICATION (H): ICD-10-CM

## 2021-04-23 NOTE — TELEPHONE ENCOUNTER
Followed up with the pts insurance and it is excluded from the pts plan. Needs to go through DME. Orders sent to [facility]. 4/23/21

## 2021-04-23 NOTE — TELEPHONE ENCOUNTER
PRIOR AUTHORIZATION DENIED    Medication: DEXCOM Receiver-PA denied    Denial Date: 4/23/2021    Denial Rational:       Appeal Information:

## 2021-04-25 ENCOUNTER — HEALTH MAINTENANCE LETTER (OUTPATIENT)
Age: 41
End: 2021-04-25

## 2021-04-26 RX ORDER — PROCHLORPERAZINE 25 MG/1
SUPPOSITORY RECTAL
Qty: 1 EACH | Refills: 3 | Status: SHIPPED | OUTPATIENT
Start: 2021-04-26

## 2021-04-26 RX ORDER — PROCHLORPERAZINE 25 MG/1
SUPPOSITORY RECTAL
Qty: 1 EACH | Refills: 0 | Status: SHIPPED | OUTPATIENT
Start: 2021-04-26

## 2021-04-26 RX ORDER — PROCHLORPERAZINE 25 MG/1
SUPPOSITORY RECTAL
Qty: 3 EACH | Refills: 11 | Status: SHIPPED | OUTPATIENT
Start: 2021-04-26

## 2021-08-02 NOTE — PROGRESS NOTES
Gabo is a 41 year old who is being evaluated via a billable video visit.      How would you like to obtain your AVS? MyChart  If the video visit is dropped, the invitation should be resent by: Send to e-mail at: [email or web address]  Will anyone else be joining your video visit? No        Video Start Time: 1:00 pm    Assessment & Plan     2- Type 1 diabetes mellitus without complication (H)    He has done well with the transition from NPH/R to Tresiba/Novolog. Still learning carb counting. He is new to DEXCOM and likes it. Is open to exploring pump options in the near future.     Plan:   - increase Tresiba to 32 units.   - continue same Novolog plan    - update HbA1c and other annual labs    Diabetes Care:   Eyes: no retinopathy, last eye exam 1.5 years ago  Kidneys: no nephropathy, last urine alb Oct 2018. - needs update - ordered today  Nerves: no neuropathy  Smoking: no  Blood Pressure: HTN, had cough to ACE inh, switched to ARB (losartan)  Lipids: no HL - START simvastatin 20 mg daily  Macrovascular: no issues      2- Subclinical hypothyroidism:   TSH 9.5 in 2018  Repeat TSH  Likely will need therapy if still in that range or higher.     3- Weight gain:   continue tracking diet  Increase exercise.     40 minutes spent on the date of the encounter doing chart review, history and exam, documentation and further activities as noted above       3 month follow up.     Katie Booker MD  [facility] ENDOCRINOLOGY [facility]IN[facility] [place]    -----------------------------------------------------------------------------    Subjective   Gabo is a 40 year old who presents for the following health issues: type 1 diabetes.     HPI     The patient was diagnosed with type 1 diabetes at age 2. Did well in childhood, was more unstable control in teen years. Then has been in good control most of his adult life with diabetes. No DKA. One severe hypoglycemia. He has hypoglycemia awareness. Has not been on pumps and CGMs. Has been on  taking NPH and R insulin. Was switched to Lantus and Humalog 5 years ago but his control was not any better and the cost was high, so he switched back. Things were good, until the past year his control has worsened. He started working from home. He now eats breakfast every day (in the past he didn't).     Weight Issue:   He has gained 35 lbs in the past year from less activity and more calorie intake.   Baseline: ~ 225-230 lbs  Max weight 259 lbs.   Current weight: 235 lbs.   Using [employer]pal, averaging 1700 Kcal/day     Last HbA1c was Oct 2019: 8.1  His baseline when he was in good control was 6.7 - 7.3.  In 2018: TSH 9.51, free T4: 0.9    Interval History:   He switched to Tresiba/Novolog and started using DEXCOM. Tracking diet with [employer] pal and learning carb counting.     Diabetes Care:   Eyes: no retinopathy, last eye exam 1.5 years ago  Kidneys: no nephropathy, last urine alb Oct 2018.   Nerves: no neuropathy  Smoking: no  Blood Pressure: HTN, had cough to ACE inh, switched to ARB (losartan)  Lipids: no HL  Macrovascular: no issues    Current treatment strategy:   Tresiba 31 units at bedtime, and Novolog 5 units per 15 grams plus 2/50 >150.    Blood Glucose Monitoring:   DEXCOM downloaded: last 14 days:   Average: 180 mg/dL, SD 59  Time in range: 51%  Above range: 47%  Below range: 1%  Trend is overall flat     Diet: 2-3 meals/day    Exercise: walking      Review of Systems   Constitutional, HEENT, cardiovascular, pulmonary, gi and gu systems are negative, except as otherwise noted.      Objective           Vitals:  No vitals were obtained today due to virtual visit.    Physical Exam   GENERAL: Healthy, alert and no distress  EYES: Eyes grossly normal to inspection.  No discharge or erythema, or obvious scleral/conjunctival abnormalities.  RESP: No audible wheeze, cough, or visible cyanosis.  No visible retractions or increased work of breathing.    SKIN: Visible skin clear. No significant rash, abnormal  pigmentation or lesions.  NEURO: Cranial nerves grossly intact.  Mentation and speech appropriate for age.  PSYCH: Mentation appears normal, affect normal/bright, judgement and insight intact, normal speech and appearance well-groomed.        Video-Visit Details    Type of service:  Video Visit    Video End Time: 1:35    Originating Location (pt. Location): Home    Distant Location (provider location):  [facility] ENDOCRINOLOGY CLIN[facility] [place]     Platform used for Video Visit: frestyl

## 2021-08-02 NOTE — PATIENT INSTRUCTIONS
We appreciate your assistance in coordinating your healthcare.     Please upload your insulin pump, blood sugar meter and/or continuous glucose monitor at home 1-2 days before your next diabetes-related appointment.   This will allow your provider to review your  data before your scheduled virtual visit.    To ask a question to your Endocrine care team, please send them a 1000memories message, or reach them by phone at [phone]     To expedite your medication refill(s), please contact your pharmacy and have them   fax a refill request to: [phone].  *Please allow 3 business days for routine medication refills.  *Please allow 5 business days for controlled substance medication refills.    For after-hours urgent Endocrine issues, that do not require 211, please dial [phone], and ask to speak with the Endocrinologist On-Call

## 2021-08-03 ENCOUNTER — VIRTUAL VISIT (OUTPATIENT)
Dept: ENDOCRINOLOGY | Facility: CLINIC | Age: 41
End: 2021-08-03
Payer: COMMERCIAL

## 2021-08-03 DIAGNOSIS — E10.9 TYPE 1 DIABETES MELLITUS WITHOUT COMPLICATION (H): Primary | ICD-10-CM

## 2021-08-03 DIAGNOSIS — E03.8 SUBCLINICAL HYPOTHYROIDISM: ICD-10-CM

## 2021-08-03 PROCEDURE — 95251 CONT GLUC MNTR ANALYSIS I&R: CPT | Performed by: INTERNAL MEDICINE

## 2021-08-03 PROCEDURE — 99215 OFFICE O/P EST HI 40 MIN: CPT | Mod: 95 | Performed by: INTERNAL MEDICINE

## 2021-08-03 RX ORDER — INSULIN DEGLUDEC 100 U/ML
32 INJECTION, SOLUTION SUBCUTANEOUS DAILY
Qty: 30 ML | Refills: 3 | Status: SHIPPED | OUTPATIENT
Start: 2021-08-03 | End: 2022-01-20

## 2021-08-03 RX ORDER — SIMVASTATIN 20 MG
20 TABLET ORAL AT BEDTIME
Qty: 90 TABLET | Refills: 3 | Status: SHIPPED | OUTPATIENT
Start: 2021-08-03 | End: 2022-01-20

## 2021-08-03 NOTE — LETTER
8/3/2021       RE: Gabo Borden  [address] [facility] [address]     Dear Colleague,    Thank you for referring your patient, Gabo Borden, to the [facility] ENDOCRINOLOGY [facility] [place] at [facility]. Please see a copy of my visit note below.    Gabo is a 41 year old who is being evaluated via a billable video visit.      How would you like to obtain your AVS? MyChart  If the video visit is dropped, the invitation should be resent by: Send to e-mail at: [email or web address]  Will anyone else be joining your video visit? No        Video Start Time: 1:00 pm    Assessment & Plan     2- Type 1 diabetes mellitus without complication (H)    He has done well with the transition from NPH/R to Tresiba/Novolog. Still learning carb counting. He is new to DEXCOM and likes it. Is open to exploring pump options in the near future.     Plan:   - increase Tresiba to 32 units.   - continue same Novolog plan    - update HbA1c and other annual labs    Diabetes Care:   Eyes: no retinopathy, last eye exam 1.5 years ago  Kidneys: no nephropathy, last urine alb Oct 2018. - needs update - ordered today  Nerves: no neuropathy  Smoking: no  Blood Pressure: HTN, had cough to ACE inh, switched to ARB (losartan)  Lipids: no HL - START simvastatin 20 mg daily  Macrovascular: no issues      2- Subclinical hypothyroidism:   TSH 9.5 in 2018  Repeat TSH  Likely will need therapy if still in that range or higher.     3- Weight gain:   continue tracking diet  Increase exercise.     40 minutes spent on the date of the encounter doing chart review, history and exam, documentation and further activities as noted above       3 month follow up.     Katie Booker MD  [facility] [place]    -----------------------------------------------------------------------------    Subjective   Gabo is a 40 year old who presents for the following health issues: type  1 diabetes.     HPI     The patient was diagnosed with type 1 diabetes at age 2. Did well in childhood, was more unstable control in teen years. Then has been in good control most of his adult life with diabetes. No DKA. One severe hypoglycemia. He has hypoglycemia awareness. Has not been on pumps and CGMs. Has been on taking NPH and R insulin. Was switched to Lantus and Humalog 5 years ago but his control was not any better and the cost was high, so he switched back. Things were good, until the past year his control has worsened. He started working from home. He now eats breakfast every day (in the past he didn't).     Weight Issue:   He has gained 35 lbs in the past year from less activity and more calorie intake.   Baseline: ~ 225-230 lbs  Max weight 259 lbs.   Current weight: 235 lbs.   Using [employer], averaging 1700 Kcal/day     Last HbA1c was Oct 2019: 8.1  His baseline when he was in good control was 6.7 - 7.3.  In 2018: TSH 9.51, free T4: 0.9    Interval History:   He switched to Tresiba/Novolog and started using DEXCOM. Tracking diet with [employer] pal and learning carb counting.     Diabetes Care:   Eyes: no retinopathy, last eye exam 1.5 years ago  Kidneys: no nephropathy, last urine alb Oct 2018.   Nerves: no neuropathy  Smoking: no  Blood Pressure: HTN, had cough to ACE inh, switched to ARB (losartan)  Lipids: no HL  Macrovascular: no issues    Current treatment strategy:   Tresiba 31 units at bedtime, and Novolog 5 units per 15 grams plus 2/50 >150.    Blood Glucose Monitoring:   DEXCOM downloaded: last 14 days:   Average: 180 mg/dL, SD 59  Time in range: 51%  Above range: 47%  Below range: 1%  Trend is overall flat     Diet: 2-3 meals/day    Exercise: walking      Review of Systems   Constitutional, HEENT, cardiovascular, pulmonary, gi and gu systems are negative, except as otherwise noted.      Objective           Vitals:  No vitals were obtained today due to virtual visit.    Physical Exam    GENERAL: Healthy, alert and no distress  EYES: Eyes grossly normal to inspection.  No discharge or erythema, or obvious scleral/conjunctival abnormalities.  RESP: No audible wheeze, cough, or visible cyanosis.  No visible retractions or increased work of breathing.    SKIN: Visible skin clear. No significant rash, abnormal pigmentation or lesions.  NEURO: Cranial nerves grossly intact.  Mentation and speech appropriate for age.  PSYCH: Mentation appears normal, affect normal/bright, judgement and insight intact, normal speech and appearance well-groomed.        Video-Visit Details    Type of service:  Video Visit    Video End Time: 1:35    Originating Location (pt. Location): Home    Distant Location (provider location):  [facility] [place]     Platform used for Video Visit: Waterfall

## 2021-08-10 ENCOUNTER — LAB (OUTPATIENT)
Dept: LAB | Facility: CLINIC | Age: 41
End: 2021-08-10
Payer: COMMERCIAL

## 2021-08-10 DIAGNOSIS — E03.8 SUBCLINICAL HYPOTHYROIDISM: ICD-10-CM

## 2021-08-10 DIAGNOSIS — E10.9 TYPE 1 DIABETES MELLITUS WITHOUT COMPLICATION (H): ICD-10-CM

## 2021-08-10 LAB
ALBUMIN SERPL-MCNC: 3.9 G/DL (ref 3.4–5)
ALP SERPL-CCNC: 61 U/L (ref 40–150)
ALT SERPL W P-5'-P-CCNC: 22 U/L (ref 0–70)
ANION GAP SERPL CALCULATED.3IONS-SCNC: 8 MMOL/L (ref 3–14)
AST SERPL W P-5'-P-CCNC: 11 U/L (ref 0–45)
BASOPHILS # BLD AUTO: 0.1 10E3/UL (ref 0–0.2)
BASOPHILS NFR BLD AUTO: 1 %
BILIRUB SERPL-MCNC: 0.4 MG/DL (ref 0.2–1.3)
BUN SERPL-MCNC: 7 MG/DL (ref 7–30)
CALCIUM SERPL-MCNC: 9 MG/DL (ref 8.5–10.1)
CHLORIDE BLD-SCNC: 105 MMOL/L (ref 94–109)
CHOLEST SERPL-MCNC: 180 MG/DL
CO2 SERPL-SCNC: 25 MMOL/L (ref 20–32)
CREAT SERPL-MCNC: 0.63 MG/DL (ref 0.66–1.25)
CREAT UR-MCNC: 28 MG/DL
EOSINOPHIL # BLD AUTO: 0.3 10E3/UL (ref 0–0.7)
EOSINOPHIL NFR BLD AUTO: 3 %
ERYTHROCYTE [DISTWIDTH] IN BLOOD BY AUTOMATED COUNT: 13.1 % (ref 10–15)
FASTING STATUS PATIENT QL REPORTED: NO
GFR SERPL CREATININE-BSD FRML MDRD: >90 ML/MIN/1.73M2
GLUCOSE BLD-MCNC: 204 MG/DL (ref 70–99)
HBA1C MFR BLD: 8.1 % (ref 0–5.6)
HCT VFR BLD AUTO: 41.1 % (ref 40–53)
HDLC SERPL-MCNC: 46 MG/DL
HGB BLD-MCNC: 14 G/DL (ref 13.3–17.7)
IMM GRANULOCYTES # BLD: 0 10E3/UL
IMM GRANULOCYTES NFR BLD: 0 %
LDLC SERPL CALC-MCNC: 119 MG/DL
LYMPHOCYTES # BLD AUTO: 3.6 10E3/UL (ref 0.8–5.3)
LYMPHOCYTES NFR BLD AUTO: 37 %
MCH RBC QN AUTO: 30.3 PG (ref 26.5–33)
MCHC RBC AUTO-ENTMCNC: 34.1 G/DL (ref 31.5–36.5)
MCV RBC AUTO: 89 FL (ref 78–100)
MICROALBUMIN UR-MCNC: 8 MG/L
MICROALBUMIN/CREAT UR: 28.57 MG/G CR (ref 0–17)
MONOCYTES # BLD AUTO: 0.5 10E3/UL (ref 0–1.3)
MONOCYTES NFR BLD AUTO: 5 %
NEUTROPHILS # BLD AUTO: 5.4 10E3/UL (ref 1.6–8.3)
NEUTROPHILS NFR BLD AUTO: 54 %
NONHDLC SERPL-MCNC: 134 MG/DL
NRBC # BLD AUTO: 0 10E3/UL
NRBC BLD AUTO-RTO: 0 /100
PLATELET # BLD AUTO: 316 10E3/UL (ref 150–450)
POTASSIUM BLD-SCNC: 3.8 MMOL/L (ref 3.4–5.3)
PROT SERPL-MCNC: 7.3 G/DL (ref 6.8–8.8)
RBC # BLD AUTO: 4.62 10E6/UL (ref 4.4–5.9)
SODIUM SERPL-SCNC: 138 MMOL/L (ref 133–144)
T4 FREE SERPL-MCNC: 0.86 NG/DL (ref 0.76–1.46)
TRIGL SERPL-MCNC: 75 MG/DL
TSH SERPL DL<=0.005 MIU/L-ACNC: 6.52 MU/L (ref 0.4–4)
WBC # BLD AUTO: 9.9 10E3/UL (ref 4–11)

## 2021-08-10 PROCEDURE — 83036 HEMOGLOBIN GLYCOSYLATED A1C: CPT | Performed by: PATHOLOGY

## 2021-08-10 PROCEDURE — 84439 ASSAY OF FREE THYROXINE: CPT | Performed by: PATHOLOGY

## 2021-08-10 PROCEDURE — 82043 UR ALBUMIN QUANTITATIVE: CPT | Performed by: PATHOLOGY

## 2021-08-10 PROCEDURE — 80050 GENERAL HEALTH PANEL: CPT | Performed by: PATHOLOGY

## 2021-08-10 PROCEDURE — 80061 LIPID PANEL: CPT | Performed by: PATHOLOGY

## 2021-08-10 PROCEDURE — 36415 COLL VENOUS BLD VENIPUNCTURE: CPT | Performed by: PATHOLOGY

## 2021-10-02 ENCOUNTER — MYC MEDICAL ADVICE (OUTPATIENT)
Dept: ENDOCRINOLOGY | Facility: CLINIC | Age: 41
End: 2021-10-02

## 2021-10-02 ENCOUNTER — TELEPHONE (OUTPATIENT)
Dept: ENDOCRINOLOGY | Facility: CLINIC | Age: 41
End: 2021-10-02

## 2021-10-02 NOTE — TELEPHONE ENCOUNTER
Attempted to reach patient to schedule follow up in the Endocrinology Clinic.  No answer,  LM on VM to call office and [employer] message sent.    Patient to schedule with diabetes PAKO in 3 months AND with Migdalia Mcgraw in 6 months. Last visit with Dr Butcher was on 8/3/21.

## 2021-10-10 ENCOUNTER — HEALTH MAINTENANCE LETTER (OUTPATIENT)
Age: 41
End: 2021-10-10

## 2021-11-23 ENCOUNTER — TELEPHONE (OUTPATIENT)
Dept: ENDOCRINOLOGY | Facility: CLINIC | Age: 41
End: 2021-11-23
Payer: COMMERCIAL

## 2021-11-23 DIAGNOSIS — E10.9 TYPE 1 DIABETES MELLITUS WITHOUT COMPLICATION (H): ICD-10-CM

## 2021-11-23 RX ORDER — INSULIN ASPART 100 [IU]/ML
INJECTION, SOLUTION INTRAVENOUS; SUBCUTANEOUS
Qty: 100 ML | Refills: 1 | Status: SHIPPED | OUTPATIENT
Start: 2021-11-23 | End: 2022-01-20

## 2021-11-23 NOTE — TELEPHONE ENCOUNTER
ANTONIOM and sent MyChart for pt to call to schedule with a new provider (Jhony no longer with our clinic) per med refill request

## 2021-11-23 NOTE — TELEPHONE ENCOUNTER
Pt needs appt with new clinic provider, previously with Dr Booker.   LCV 08/03/2021   CCs notified. 90 day supply with 1 refill pended to on-call to cover until seen.

## 2021-11-23 NOTE — TELEPHONE ENCOUNTER
NOVOLOG FLEXPEN 100 UNIT/ML soln      Last Written Prescription Date:  03/24/21  Last Fill Quantity: 30mL,   # refills: 6  Last Office Visit : 08/03/21  Future Office visit:  None scheduled    Routing refill request to provider for review/approval because:  Insulin - refilled per clinic

## 2021-12-04 ENCOUNTER — HEALTH MAINTENANCE LETTER (OUTPATIENT)
Age: 41
End: 2021-12-04

## 2022-01-18 ENCOUNTER — TELEPHONE (OUTPATIENT)
Dept: ENDOCRINOLOGY | Facility: CLINIC | Age: 42
End: 2022-01-18
Payer: COMMERCIAL

## 2022-01-18 NOTE — TELEPHONE ENCOUNTER
Attempted to reach patient to have him upload to Dexcom, or to provide us with the last two weeks of his glucose readings.

## 2022-01-18 NOTE — PROGRESS NOTES
Outcome for 01/18/22 9:16 AM: Left mail    Faye Jeong CMA    Outcome for 01/18/22 1:29 PM: Left Rioil    Faye Jeong CMA

## 2022-01-19 ENCOUNTER — VIRTUAL VISIT (OUTPATIENT)
Dept: ENDOCRINOLOGY | Facility: CLINIC | Age: 42
End: 2022-01-19
Payer: COMMERCIAL

## 2022-01-19 DIAGNOSIS — I10 HYPERTENSION, UNSPECIFIED TYPE: ICD-10-CM

## 2022-01-19 DIAGNOSIS — E66.811 CLASS 1 OBESITY WITH SERIOUS COMORBIDITY AND BODY MASS INDEX (BMI) OF 30.0 TO 30.9 IN ADULT, UNSPECIFIED OBESITY TYPE: ICD-10-CM

## 2022-01-19 DIAGNOSIS — E10.9 TYPE 1 DIABETES MELLITUS WITHOUT COMPLICATION (H): ICD-10-CM

## 2022-01-19 DIAGNOSIS — R63.5 WEIGHT GAIN: ICD-10-CM

## 2022-01-19 DIAGNOSIS — E78.5 HYPERLIPIDEMIA LDL GOAL <100: ICD-10-CM

## 2022-01-19 DIAGNOSIS — E03.8 SUBCLINICAL HYPOTHYROIDISM: Primary | ICD-10-CM

## 2022-01-19 PROBLEM — E11.9 DIABETES MELLITUS, TYPE 2 (H): Status: ACTIVE | Noted: 2022-01-19

## 2022-01-19 PROCEDURE — 99215 OFFICE O/P EST HI 40 MIN: CPT | Mod: 95 | Performed by: INTERNAL MEDICINE

## 2022-01-19 ASSESSMENT — ENCOUNTER SYMPTOMS
BLOOD IN STOOL: 0
RECTAL PAIN: 0
CONSTIPATION: 0
BLOATING: 0
BOWEL INCONTINENCE: 0
HEARTBURN: 1
ABDOMINAL PAIN: 0
NAUSEA: 0
VOMITING: 0
JAUNDICE: 0
DIARRHEA: 0

## 2022-01-19 NOTE — PROGRESS NOTES
Gabo Borden  is being evaluated via a billable video visit.      How would you like to obtain your AVS? MyCharBaremetrics  For the video visit, send the invitation by: Send to e-mail at: [email or web address]  Will anyone else be joining your video visit? No

## 2022-01-19 NOTE — NURSING NOTE
Patient denies any changes since echeck-in regarding medication and allergies and states all information entered during echeck-in remains accurate.  Quin Olvera on 1/19/2022 at 2:12 PM

## 2022-01-19 NOTE — PATIENT INSTRUCTIONS
Nice to talk with you in virtual clinic    For now, please continue taking your insulin as you are:  NOVOLOG 5 U PER 15 G CHO (1 PER 3)  TRESIBA 32 UNITS DAILY  NOVOLOG 2U PER 50 MG/DL>150 (1 PER 25)     PLEASE INCREASE SIMVASTATIN FROM 20 TO 40 MG DAILY FOR LDL GOAL OF <100 TO REDUCE YOUR VASCULAR RISK    PLEASE INCREASE YOUR LOSARTAN DOSE FROM 25 TO 50 MG DAILY FOR YOUR HTN. Blood pressure goals, in general, for patients with hypertension <130/80. The dose increase will help reduce the microalbuminuria.    PLEASE SEE EYE MD ONCE A YEAR, REFERRAL IS IN    For BMI 30, weight loss is recommended to improve your clinica risk profile since you have DM1, HTN, & Hyperlipidemia - Please follow a 1700 calorie meal plan to lose 1 lbs weekly without exercise (based on REE calc 2200)  Start walking program working up to 4 miles daily    Use low sodium meal replacements such as Lean Cuisines and/or Healthy Choice meals and this will make it easier to count # of carbohydrates in grams and dose Novolog insulin accordingly. For example: if your meal has 30 grams of carbohydrates, you would take 10 units of Novolog to cover that + any correction if your glucose is >140 starting out the meal.     Please go to any [employer] lab. Follow standard safety precautions for COVID-19, practice social isolation, hand hygiene, do not touch your face, nose, or mouth, wear mask if have to go out in public to be respectful of community.  Please contact us to schedule at any of our [employer] lab locations. Call [phone] ([phone]), select option 1.    Please continue to check your blood sugars when you are fasting in the morning, and before a meal and 2 hours after a meal and at bedtime and log    Fasting goals are .  2 hours after eating goals are: <180.    If your fasting sugars are lower or higher than the range, you will need to make an adjustment on your overnight basal insulin rate(s).    If your sugars  after eating are lower or higher than the range, then you will need to make an adjustment on your meal time insulin (ICR/Insulin to carbohydrate ratio).  Decrease or increase basal insulin rates overnight by 0.1 units 2x weekly until fasting blood sugars are in  range.    For questions about blood sugars, please call [phone] during the day or [phone] anytime & ask the  to page diabetes/endocrine on-call.    Diabetes Education (Ericka Lewis, RN, CDE) in 2-4 weeks, Diabetes Team PA in 6 months, me in 12 months    Please use [employer] to schedule your appointment(s) or call [phone] to schedule in Endocrinology and Diabetes Clinic    Best Wishes,  Dr. Raya Solano MD, MPH  Endocrinologist

## 2022-01-19 NOTE — PROGRESS NOTES
Diabetes & Endocrinology Consult Follow up Note    Reason for visit/consult: DM type 1    Primary care provider: [facility] MD Lydia    HPI:  40 yo DM 1 X 40 YRS. DX MADE IN [facility]. He did not have a chance to upload his glucose data yet. Is on a statin 20 mg since lv w/ Malaeb    DM MEDS:  NOVOLOG 5 U PER 15 G CHO (1 PER 3)  TRESIBA 32 UNITS DAILY - TRIED 33 BUT WAS HAVING LOWS (UNDERSTANDS HOW TO MANAGE BASAL INSULIN)  NOVOLOG 2U PER 50 MG/DL>150 (1 PER 25)     IS USING DEXCOM G6    SUBJ DATA GLUCS: AVG X 90 DAYS . FASTING SUGARS ARE MUCH BETTER ON TRESIBA. GAINED WT DURING PANDEMIC.     OBJ DATA GLUCS: recent = NONE. Gabo's login for DEXCOM HOME USERS [email or web address] is:  UN: [email or web address]  PW: [email or web address]! (corby is capital O, t, a, small o, small L, 2,1!)  (the above was tried & revealed he had not yet uploaded his data, there was old data there, see below)    This is the 1st time I am seeing this patient. Saw Malaeb on  8/3/21   Plan then was   - increase Tresiba to 32 units.   - continue same Novolog plan . . . (see rest of her note for details).                 - update HbA1c and other annual labs   Lab Results   Component Value Date    A1C 8.1 08/10/2021     ENDO DIABETES Latest Ref Rng & Units 8/10/2021   GLUCOSE 70 - 99 mg/dL 204 (H)   A1C 0.0 - 5.6 % 8.1 (H)   ALT 0 - 70 U/L 22   AST 0 - 45 U/L 11   CHOL <200 mg/dL 180   LDL <=100 mg/dL 119 (H)   HDL >=40 mg/dL 46   NHDL <130 mg/dL 134 (H)   TRIGLYCERIDES <150 mg/dL 75   CREATININE 0.66 - 1.25 mg/dL 0.63 (L)   HCT 40.0 - 53.0 % 41.1   HGB 13.3 - 17.7 g/dL 14.0   MICROL mg/L 8   UMALCR 0.00 - 17.00 mg/g Cr 28.57 (H)       Diabetes Care:   Eyes: no retinopathy, last eye exam >1.5 years ago  Kidneys: no nephropathy, last urine alb Oct 2018. - needs update - ordered today  Nerves: no neuropathy  Smoking: no  Blood Pressure: HTN, had cough to ACE inh, switched to ARB (losartan)  Lipids: no HL - STARTED simvastatin 20 mg daily  Macrovascular:  "no issues     2- Subclinical hypothyroidism:   TSH 9.5 in 2018  Repeat TSH, rx when/if TSH = 10 or sxs develop  ENDO THYROID LABS-[facility] Latest Ref Rng & Units 8/10/2021   TSH 0.40 - 4.00 mU/L 6.52 (H)   FREE T4 0.76 - 1.46 ng/dL 0.86     Likely will need therapy if still in that range or higher.      3- Weight gain (RECENT OBJ DATA LACKING):   continue tracking diet  Increase exercise.   WEIGHT MGMT RESULTS/MEDICATIONS Latest Ref Rng & Units 8/13/2020   TRESIBA FLEXTOUCH 100 UNIT/ML SC SOPN     NOVOLOG FLEXPEN 100 UNIT/ML SC SOPN     INSULIN REGULAR HUMAN 100 UNIT/ML IJ SOLN     INSULIN ISOPHANE HUMAN VIAL 100 UNIT/ML SC SUSP     INSULIN DEGLUDEC 100 UNIT/ML SC SOPN     Weight  250 lbs   Height  6' 4\"   BP  146/82   Pulse     BMI (Calculated)  30.43     Dexcom Clarity   no obj glucose data avail ATOV    Past Medical/Surgical History:  No past medical history on file.  No past surgical history on file.    Allergies:  Allergies   Allergen Reactions     Ciprofloxacin Muscle Pain (Myalgia)     Tendinitis        Current Medications MEDICATIONS IN THIS CHART MAY NOT BE ACCURATE.    Current Outpatient Medications   Medication     cetirizine (ZYRTEC) 10 MG tablet     Continuous Blood Gluc  (DEXCOM G6 ) JESSICA     Continuous Blood Gluc Sensor (DEXCOM G6 SENSOR) MISC     Continuous Blood Gluc Transmit (DEXCOM G6 TRANSMITTER) MISC     insulin degludec (TRESIBA FLEXTOUCH) 100 UNIT/ML pen     insulin pen needle (31G X 5 MM) 31G X 5 MM miscellaneous     NOVOLOG FLEXPEN 100 UNIT/ML soln     simvastatin (ZOCOR) 20 MG tablet     vitamin D3 (CHOLECALCIFEROL) 2000 units (50 mcg) tablet     No current facility-administered medications for this visit.       Family History:  No family history on file.    Social History: OCC/JOB TITLE: [employer] OF [employer]AL[employer], HE SAYS HE DOES NOT HAVE A DEGREE.  Social History     Tobacco Use     Smoking status: Current Every Day Smoker     Smokeless " tobacco: Never Used   Substance Use Topics     Alcohol use: Not on file       ROS:  see HPI    Exam  No vitals, video visit  GEN: Healthy, alert and no distress  HEENT: EOMI  RESP: No audible wheeze, cough, or visible cyanosis  SKIN: Visible skin clear  NEURO: Cranial nerves grossly intact. Mentation and speech appropriate for age  PSYCH: Mentation appears normal, affect normal/bright, judgement and insight intact, normal speech and appearance well-groomed    Labs/Imaging  See above  Lab Results   Component Value Date    A1C 8.1 08/10/2021     Creatinine   Date Value Ref Range Status   08/10/2021 0.63 (L) 0.66 - 1.25 mg/dL Final     LDL Cholesterol Calculated   Date Value Ref Range Status   08/10/2021 119 (H) <=100 mg/dL Final     Comment:     Age 0-19 years:  Desirable: 0-110 mg/dL   Borderline high: 110-129 mg/dL   High: >= 130 mg/dL    Age 20 years and older:  Desirable: <100mg/dL  Above desirable: 100-129 mg/dL   Borderline high: 130-159 mg/dL   High: 160-189 mg/dL   Very high: >= 190 mg/dL     ENDO DIABETES Latest Ref Rng & Units 8/10/2021   GLUCOSE 70 - 99 mg/dL 204 (H)   A1C 0.0 - 5.6 % 8.1 (H)   ALT 0 - 70 U/L 22   AST 0 - 45 U/L 11   CHOL <200 mg/dL 180   LDL <=100 mg/dL 119 (H)   HDL >=40 mg/dL 46   NHDL <130 mg/dL 134 (H)   TRIGLYCERIDES <150 mg/dL 75   CREATININE 0.66 - 1.25 mg/dL 0.63 (L)   HCT 40.0 - 53.0 % 41.1   HGB 13.3 - 17.7 g/dL 14.0   MICROL mg/L 8   UMALCR 0.00 - 17.00 mg/g Cr 28.57 (H)   POTASSIUM 3.4 - 5.3 mmol/L 3.8   RBC 4.40 - 5.90 10e6/uL 4.62   RDW 10.0 - 15.0 % 13.1   WBC 4.0 - 11.0 10e3/uL 9.9   MCH 26.5 - 33.0 pg 30.3   MCHC 31.5 - 36.5 g/dL 34.1   MCV 78 - 100 fL 89    - 450 10e3/uL 316     Assessment and Plan    T1DM x 40 yrs associated w/ microalbuminuria, HTN & Hyperlidemia, his LDL is above goal of LDL<100, incr statin & ARB doses for goal BP <130/80.    Subclinical Hypothyroidism, consider starting LT4 after next set of thyroid labs if TSH = 10 or sxs develop or if FT4  drops below normal range    Labs: See orders    Refilled insulins    Patient Instructions:  Nice to talk with you in virtual clinic    For now, please continue taking your insulin as you are:  NOVOLOG 5 U PER 15 G CHO (1 PER 3)  TRESIBA 32 UNITS DAILY  NOVOLOG 2U PER 50 MG/DL>150 (1 PER 25)     PLEASE INCREASE SIMVASTATIN FROM 20 TO 40 MG DAILY FOR LDL GOAL OF <100 TO REDUCE YOUR VASCULAR RISK    PLEASE INCREASE YOUR LOSARTAN DOSE FROM 25 TO 50 MG DAILY FOR YOUR HTN. Blood pressure goals, in general, for patients with hypertension <130/80. The dose increase will help reduce the microalbuminuria.    PLEASE SEE EYE MD ONCE A YEAR, REFERRAL IS IN    For BMI 30, weight loss is recommended to improve your clinica risk profile since you have DM1, HTN, & Hyperlipidemia - Please follow a 1700 calorie meal plan to lose 1 lbs weekly without exercise (based on REE calc 2200)  Start walking program working up to 4 miles daily    Use low sodium meal replacements such as Lean Cuisines and/or Healthy Choice meals and this will make it easier to count # of carbohydrates in grams and dose Novolog insulin accordingly. For example: if your meal has 30 grams of carbohydrates, you would take 10 units of Novolog to cover that + any correction if your glucose is >140 starting out the meal.     Please go to any [employer] lab. Follow standard safety precautions for COVID-19, practice social isolation, hand hygiene, do not touch your face, nose, or mouth, wear mask if have to go out in public to be respectful of community.  Please contact us to schedule at any of our [facility] [facility] lab locations. Call [phone] ([phone]), select option 1.    Please continue to check your blood sugars when you are fasting in the morning, and before a meal and 2 hours after a meal and at bedtime and log    Fasting goals are .  2 hours after eating goals are: <180.    If your fasting sugars are lower or higher than the range, you  will need to make an adjustment on your overnight basal insulin rate(s).    If your sugars after eating are lower or higher than the range, then you will need to make an adjustment on your meal time insulin (ICR/Insulin to carbohydrate ratio).  Decrease or increase basal insulin rates overnight by 0.1 units 2x weekly until fasting blood sugars are in  range.    For questions about blood sugars, please call [phone] during the day or [phone] anytime & ask the  to page diabetes/endocrine on-call.    Diabetes Education (Ericka Lewis, RN, CDE) in 2-4 weeks, Diabetes Team PA in 6 months, me in 12 months    Please use [employer] to schedule your appointment(s) or call [phone] to schedule in Endocrinology and Diabetes Clinic    Best Wishes,  Dr. Raya Solano MD, MPH  Endocrinologist      Video: Start: 01/19/2022 02:47 pm  Stop: 01/19/2022 03:26 pm    Total Time: 51 min spent on chart review, evaluation, management, counseling, education, & motivational interviewing on the day of encounter        Answers for HPI/ROS submitted by the patient on 1/19/2022  General Symptoms: No  Skin Symptoms: No  HENT Symptoms: No  EYE SYMPTOMS: No  HEART SYMPTOMS: No  LUNG SYMPTOMS: No  INTESTINAL SYMPTOMS: Yes  URINARY SYMPTOMS: No  REPRODUCTIVE SYMPTOMS: No  SKELETAL SYMPTOMS: No  BLOOD SYMPTOMS: No  NERVOUS SYSTEM SYMPTOMS: No  MENTAL HEALTH SYMPTOMS: No  Heart burn or indigestion: Yes  Nausea: No  Vomiting: No  Abdominal pain: No  Bloating: No  Constipation: No  Diarrhea: No  Blood in stool: No  Black stools: No  Rectal or Anal pain: No  Fecal incontinence: No  Yellowing of skin or eyes: No  Vomit with blood: No  Change in stools: No

## 2022-01-19 NOTE — LETTER
1/19/2022       RE: Gabo Borden  [address] [facility] [address]     Dear Colleague,    Thank you for referring your patient, Gabo Borden, to the [facility] END[facility]OLOG[facility] [place] [facility]. Please see a copy of my visit note below.    Outcome for 01/17/22 1:15 PM: [employer] message sent  AVE Ann      Outcome for 01/18/22 9:16 AM: Left Voicemail    Faye Jeong CMA    Outcome for 01/18/22 1:29 PM: Left Voicemail    Faye Jeong CMA    Gabo Borden  is being evaluated via a billable video visit.      How would you like to obtain your AVS? [employer]  For the video visit, send the invitation by: Send to e-mail at: [email or web address]  Will anyone else be joining your video visit? No         Diabetes & Endocrinology Consult Follow up Note    Reason for visit/consult: DM type 1    Primary care provider: [facility] MD Lydia    HPI:  42 yo DM 1 X 40 YRS. DX MADE IN [facility]. He did not have a chance to upload his glucose data yet. Is on a statin 20 mg since lv w/ Malaeb    DM MEDS:  NOVOLOG 5 U PER 15 G CHO (1 PER 3)  TRESIBA 32 UNITS DAILY - TRIED 33 BUT WAS HAVING LOWS (UNDERSTANDS HOW TO MANAGE BASAL INSULIN)  NOVOLOG 2U PER 50 MG/DL>150 (1 PER 25)     IS USING DEXCOM G6    SUBJ DATA GLUCS: AVG X 90 DAYS . FASTING SUGARS ARE MUCH BETTER ON TRESIBA. GAINED WT DURING PANDEMIC.     OBJ DATA GLUCS: recent = NONE. Gabo's login for DEXCOM HOME USERS [email or web address] is:  UN: [email or web address]  PW: [email or web address]! (corby is capital O, t, a, small o, small L, 2,1!)  (the above was tried & revealed he had not yet uploaded his data, there was old data there, see below)    This is the 1st time I am seeing this patient. Saw Malaeb on  8/3/21   Plan then was   - increase Tresiba to 32 units.   - continue same Novolog plan . . . (see rest of her note for details).                 - update HbA1c and other annual labs   Lab  "Results   Component Value Date    A1C 8.1 08/10/2021     ENDO DIABETES Latest Ref Rng & Units 8/10/2021   GLUCOSE 70 - 99 mg/dL 204 (H)   A1C 0.0 - 5.6 % 8.1 (H)   ALT 0 - 70 U/L 22   AST 0 - 45 U/L 11   CHOL <200 mg/dL 180   LDL <=100 mg/dL 119 (H)   HDL >=40 mg/dL 46   NHDL <130 mg/dL 134 (H)   TRIGLYCERIDES <150 mg/dL 75   CREATININE 0.66 - 1.25 mg/dL 0.63 (L)   HCT 40.0 - 53.0 % 41.1   HGB 13.3 - 17.7 g/dL 14.0   MICROL mg/L 8   UMALCR 0.00 - 17.00 mg/g Cr 28.57 (H)       Diabetes Care:   Eyes: no retinopathy, last eye exam >1.5 years ago  Kidneys: no nephropathy, last urine alb Oct 2018. - needs update - ordered today  Nerves: no neuropathy  Smoking: no  Blood Pressure: HTN, had cough to ACE inh, switched to ARB (losartan)  Lipids: no HL - STARTED simvastatin 20 mg daily  Macrovascular: no issues     2- Subclinical hypothyroidism:   TSH 9.5 in 2018  Repeat TSH, rx when/if TSH = 10 or sxs develop  ENDO THYROID LABS-[facility] Latest Ref Rng & Units 8/10/2021   TSH 0.40 - 4.00 mU/L 6.52 (H)   FREE T4 0.76 - 1.46 ng/dL 0.86     Likely will need therapy if still in that range or higher.      3- Weight gain (RECENT OBJ DATA LACKING):   continue tracking diet  Increase exercise.   WEIGHT MGMT RESULTS/MEDICATIONS Latest Ref Rng & Units 8/13/2020   TRESIBA FLEXTOUCH 100 UNIT/ML SC SOPN     NOVOLOG FLEXPEN 100 UNIT/ML SC SOPN     INSULIN REGULAR HUMAN 100 UNIT/ML IJ SOLN     INSULIN ISOPHANE HUMAN VIAL 100 UNIT/ML SC SUSP     INSULIN DEGLUDEC 100 UNIT/ML SC SOPN     Weight  250 lbs   Height  6' 4\"   BP  146/82   Pulse     BMI (Calculated)  30.43     Dexcom Clarity   no obj glucose data avail ATOV    Past Medical/Surgical History:  No past medical history on file.  No past surgical history on file.    Allergies:  Allergies   Allergen Reactions     Ciprofloxacin Muscle Pain (Myalgia)     Tendinitis        Current Medications MEDICATIONS IN THIS CHART MAY NOT BE ACCURATE.    Current Outpatient Medications   Medication     " cetirizine (ZYRTEC) 10 MG tablet     Continuous Blood Gluc  (DEXCOM G6 ) JESSICA     Continuous Blood Gluc Sensor (DEXCOM G6 SENSOR) MISC     Continuous Blood Gluc Transmit (DEXCOM G6 TRANSMITTER) MISC     insulin degludec (TRESIBA FLEXTOUCH) 100 UNIT/ML pen     insulin pen needle (31G X 5 MM) 31G X 5 MM miscellaneous     NOVOLOG FLEXPEN 100 UNIT/ML soln     simvastatin (ZOCOR) 20 MG tablet     vitamin D3 (CHOLECALCIFEROL) 2000 units (50 mcg) tablet     No current facility-administered medications for this visit.       Family History:  No family history on file.    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  Social History     Tobacco Use     Smoking status: Current Every Day Smoker     Smokeless tobacco: Never Used   Substance Use Topics     Alcohol use: Not on file       ROS:  see HPI    Exam  No vitals, video visit  GEN: Healthy, alert and no distress  HEENT: EOMI  RESP: No audible wheeze, cough, or visible cyanosis  SKIN: Visible skin clear  NEURO: Cranial nerves grossly intact. Mentation and speech appropriate for age  PSYCH: Mentation appears normal, affect normal/bright, judgement and insight intact, normal speech and appearance well-groomed    Labs/Imaging  See above  Lab Results   Component Value Date    A1C 8.1 08/10/2021     Creatinine   Date Value Ref Range Status   08/10/2021 0.63 (L) 0.66 - 1.25 mg/dL Final     LDL Cholesterol Calculated   Date Value Ref Range Status   08/10/2021 119 (H) <=100 mg/dL Final     Comment:     Age 0-19 years:  Desirable: 0-110 mg/dL   Borderline high: 110-129 mg/dL   High: >= 130 mg/dL    Age 20 years and older:  Desirable: <100mg/dL  Above desirable: 100-129 mg/dL   Borderline high: 130-159 mg/dL   High: 160-189 mg/dL   Very high: >= 190 mg/dL     ENDO DIABETES Latest Ref Rng & Units 8/10/2021   GLUCOSE 70 - 99 mg/dL 204 (H)   A1C 0.0 - 5.6 % 8.1 (H)   ALT 0 - 70 U/L 22   AST 0  - 45 U/L 11   CHOL <200 mg/dL 180   LDL <=100 mg/dL 119 (H)   HDL >=40 mg/dL 46   NHDL <130 mg/dL 134 (H)   TRIGLYCERIDES <150 mg/dL 75   CREATININE 0.66 - 1.25 mg/dL 0.63 (L)   HCT 40.0 - 53.0 % 41.1   HGB 13.3 - 17.7 g/dL 14.0   MICROL mg/L 8   UMALCR 0.00 - 17.00 mg/g Cr 28.57 (H)   POTASSIUM 3.4 - 5.3 mmol/L 3.8   RBC 4.40 - 5.90 10e6/uL 4.62   RDW 10.0 - 15.0 % 13.1   WBC 4.0 - 11.0 10e3/uL 9.9   MCH 26.5 - 33.0 pg 30.3   MCHC 31.5 - 36.5 g/dL 34.1   MCV 78 - 100 fL 89    - 450 10e3/uL 316     Assessment and Plan    T1DM x 40 yrs associated w/ microalbuminuria, HTN & Hyperlidemia, his LDL is above goal of LDL<100, incr statin & ARB doses for goal BP <130/80.    Subclinical Hypothyroidism, consider starting LT4 after next set of thyroid labs if TSH = 10 or sxs develop or if FT4 drops below normal range    Labs: See orders    Refilled insulins    Patient Instructions:  Nice to talk with you in virtual clinic    For now, please continue taking your insulin as you are:  NOVOLOG 5 U PER 15 G CHO (1 PER 3)  TRESIBA 32 UNITS DAILY  NOVOLOG 2U PER 50 MG/DL>150 (1 PER 25)     PLEASE INCREASE SIMVASTATIN FROM 20 TO 40 MG DAILY FOR LDL GOAL OF <100 TO REDUCE YOUR VASCULAR RISK    PLEASE INCREASE YOUR LOSARTAN DOSE FROM 25 TO 50 MG DAILY FOR YOUR HTN. Blood pressure goals, in general, for patients with hypertension <130/80. The dose increase will help reduce the microalbuminuria.    PLEASE SEE EYE MD ONCE A YEAR, REFERRAL IS IN    For BMI 30, weight loss is recommended to improve your clinica risk profile since you have DM1, HTN, & Hyperlipidemia - Please follow a 1700 calorie meal plan to lose 1 lbs weekly without exercise (based on REE calc 2200)  Start walking program working up to 4 miles daily    Use low sodium meal replacements such as Lean Cuisines and/or Healthy Choice meals and this will make it easier to count # of carbohydrates in grams and dose Novolog insulin accordingly. For example: if your meal has  30 grams of carbohydrates, you would take 10 units of Novolog to cover that + any correction if your glucose is >140 starting out the meal.     Please go to any [facility] lab. Follow standard safety precautions for COVID-19, practice social isolation, hand hygiene, do not touch your face, nose, or mouth, wear mask if have to go out in public to be respectful of community.  Please contact us to schedule at any of our [facility] lab locations. Call [phone] ([phone]), select option 1.    Please continue to check your blood sugars when you are fasting in the morning, and before a meal and 2 hours after a meal and at bedtime and log    Fasting goals are .  2 hours after eating goals are: <180.    If your fasting sugars are lower or higher than the range, you will need to make an adjustment on your overnight basal insulin rate(s).    If your sugars after eating are lower or higher than the range, then you will need to make an adjustment on your meal time insulin (ICR/Insulin to carbohydrate ratio).  Decrease or increase basal insulin rates overnight by 0.1 units 2x weekly until fasting blood sugars are in  range.    For questions about blood sugars, please call [phone] during the day or [phone] anytime & ask the  to page diabetes/endocrine on-call.    Diabetes Education (Ericka Lewis, RN, CDE) in 2-4 weeks, Diabetes Team PA in 6 months, me in 12 months    Please use [employer] to schedule your appointment(s) or call [phone] to schedule in Endocrinology and Diabetes Clinic    Dr. Raya Eldridge MD, MPH  Endocrinologist    Video: Start: 01/19/2022 02:47 pm  Stop: 01/19/2022 03:26 pm    Total Time: 51 min spent on chart review, evaluation, management, counseling, education, & motivational interviewing on the day of encounter    Answers for HPI/ROS submitted by the patient on 1/19/2022  General Symptoms: No  Skin Symptoms: No  HENT Symptoms:  No  EYE SYMPTOMS: No  HEART SYMPTOMS: No  LUNG SYMPTOMS: No  INTESTINAL SYMPTOMS: Yes  URINARY SYMPTOMS: No  REPRODUCTIVE SYMPTOMS: No  SKELETAL SYMPTOMS: No  BLOOD SYMPTOMS: No  NERVOUS SYSTEM SYMPTOMS: No  MENTAL HEALTH SYMPTOMS: No  Heart burn or indigestion: Yes  Nausea: No  Vomiting: No  Abdominal pain: No  Bloating: No  Constipation: No  Diarrhea: No  Blood in stool: No  Black stools: No  Rectal or Anal pain: No  Fecal incontinence: No  Yellowing of skin or eyes: No  Vomit with blood: No  Change in stools: No

## 2022-01-20 RX ORDER — SIMVASTATIN 20 MG
40 TABLET ORAL AT BEDTIME
Qty: 90 TABLET | Refills: 3 | Status: SHIPPED | OUTPATIENT
Start: 2022-01-20

## 2022-01-20 RX ORDER — INSULIN ASPART 100 [IU]/ML
INJECTION, SOLUTION INTRAVENOUS; SUBCUTANEOUS
Qty: 75 ML | Refills: 11 | Status: SHIPPED | OUTPATIENT
Start: 2022-01-20

## 2022-01-20 RX ORDER — INSULIN DEGLUDEC 100 U/ML
32 INJECTION, SOLUTION SUBCUTANEOUS DAILY
Qty: 45 ML | Refills: 2 | Status: SHIPPED | OUTPATIENT
Start: 2022-01-20 | End: 2023-06-22

## 2022-01-20 RX ORDER — LOSARTAN POTASSIUM 50 MG/1
50 TABLET ORAL DAILY
Qty: 90 TABLET | Refills: 3 | Status: SHIPPED | OUTPATIENT
Start: 2022-01-20

## 2022-01-20 RX ORDER — IBUPROFEN 600 MG/1
1 TABLET ORAL ONCE
Qty: 1 KIT | Refills: 1 | Status: SHIPPED | OUTPATIENT
Start: 2022-01-20 | End: 2022-01-20

## 2022-01-21 ENCOUNTER — TELEPHONE (OUTPATIENT)
Dept: ENDOCRINOLOGY | Facility: CLINIC | Age: 42
End: 2022-01-21

## 2022-01-21 NOTE — TELEPHONE ENCOUNTER
----- Message from Raya Solano MD sent at 1/20/2022  7:04 PM CST -----  Regarding: re: pls call pt, sched fu  Diabetes Education (Ericka Lewis, RN, CDE) in 2-4 weeks, Diabetes Team PA in 6 months, me in 12 months    Needs eye MD appt as well    thx

## 2022-01-21 NOTE — TELEPHONE ENCOUNTER
Called and spoke with Gabo - he said he has been in contact with diabetic education, and was waiting to schedule other follow ups until he switches jobs - wants to make sure insurance will cover it

## 2022-01-26 DIAGNOSIS — R63.5 WEIGHT GAIN: ICD-10-CM

## 2022-01-26 DIAGNOSIS — E03.8 SUBCLINICAL HYPOTHYROIDISM: ICD-10-CM

## 2022-01-26 DIAGNOSIS — E10.9 TYPE 1 DIABETES MELLITUS WITHOUT COMPLICATION (H): ICD-10-CM

## 2022-01-26 DIAGNOSIS — E78.5 HYPERLIPIDEMIA LDL GOAL <100: ICD-10-CM

## 2022-03-26 ENCOUNTER — HEALTH MAINTENANCE LETTER (OUTPATIENT)
Age: 42
End: 2022-03-26

## 2022-07-16 ENCOUNTER — HEALTH MAINTENANCE LETTER (OUTPATIENT)
Age: 42
End: 2022-07-16

## 2022-09-18 ENCOUNTER — HEALTH MAINTENANCE LETTER (OUTPATIENT)
Age: 42
End: 2022-09-18

## 2023-01-28 ENCOUNTER — HEALTH MAINTENANCE LETTER (OUTPATIENT)
Age: 43
End: 2023-01-28

## 2023-05-07 ENCOUNTER — HEALTH MAINTENANCE LETTER (OUTPATIENT)
Age: 43
End: 2023-05-07

## 2023-06-20 DIAGNOSIS — E10.9 TYPE 1 DIABETES MELLITUS WITHOUT COMPLICATION (H): ICD-10-CM

## 2023-06-20 DIAGNOSIS — E78.5 HYPERLIPIDEMIA LDL GOAL <100: ICD-10-CM

## 2023-06-20 DIAGNOSIS — R63.5 WEIGHT GAIN: ICD-10-CM

## 2023-06-20 DIAGNOSIS — E03.8 SUBCLINICAL HYPOTHYROIDISM: ICD-10-CM

## 2023-06-22 RX ORDER — INSULIN DEGLUDEC 100 U/ML
32 INJECTION, SOLUTION SUBCUTANEOUS DAILY
Qty: 30 ML | Refills: 2 | Status: SHIPPED | OUTPATIENT
Start: 2023-06-22

## 2023-06-22 NOTE — TELEPHONE ENCOUNTER
Long Acting Insulin Protocol Failed 06/22/2023 08:11 AM   Protocol Details  Serum creatinine on file in past 12 months    HgbA1C in past 3 or 6 months    Recent (6 mo) or future (30 days) visit within the authorizing provider's specialty     CCs notified to schedule with new provider.   Multiple lab orders still in place through Dr Solano  Pended to on-call

## 2023-06-22 NOTE — TELEPHONE ENCOUNTER
insulin degludec (TRESIBA FLEXTOUCH) 100 UNIT/ML pen         Last Written Prescription Date:  1/20/22  Last Fill Quantity: 45 ml,   # refills: 2  Last Office Visit : 1/19/22  Future Office visit:  None noted    Routing refill request to provider for review/approval because:  Insulin - refilled per clinic

## 2023-10-08 ENCOUNTER — HEALTH MAINTENANCE LETTER (OUTPATIENT)
Age: 43
End: 2023-10-08

## 2024-02-25 ENCOUNTER — HEALTH MAINTENANCE LETTER (OUTPATIENT)
Age: 44
End: 2024-02-25

## 2024-07-14 ENCOUNTER — HEALTH MAINTENANCE LETTER (OUTPATIENT)
Age: 44
End: 2024-07-14

## 2024-12-01 ENCOUNTER — HEALTH MAINTENANCE LETTER (OUTPATIENT)
Age: 44
End: 2024-12-01